# Patient Record
Sex: FEMALE | Race: WHITE | NOT HISPANIC OR LATINO
[De-identification: names, ages, dates, MRNs, and addresses within clinical notes are randomized per-mention and may not be internally consistent; named-entity substitution may affect disease eponyms.]

---

## 2019-03-06 DIAGNOSIS — R06.02 SHORTNESS OF BREATH: ICD-10-CM

## 2019-03-06 DIAGNOSIS — I44.7 LEFT BUNDLE-BRANCH BLOCK, UNSPECIFIED: ICD-10-CM

## 2019-03-06 DIAGNOSIS — E78.5 HYPERLIPIDEMIA, UNSPECIFIED: ICD-10-CM

## 2019-03-06 DIAGNOSIS — I10 ESSENTIAL (PRIMARY) HYPERTENSION: ICD-10-CM

## 2019-03-06 DIAGNOSIS — Z78.9 OTHER SPECIFIED HEALTH STATUS: ICD-10-CM

## 2019-03-06 DIAGNOSIS — E03.9 HYPOTHYROIDISM, UNSPECIFIED: ICD-10-CM

## 2019-03-06 PROBLEM — Z00.00 ENCOUNTER FOR PREVENTIVE HEALTH EXAMINATION: Status: ACTIVE | Noted: 2019-03-06

## 2019-03-06 RX ORDER — SACUBITRIL AND VALSARTAN 97; 103 MG/1; MG/1
97-103 TABLET, FILM COATED ORAL TWICE DAILY
Refills: 0 | Status: ACTIVE | COMMUNITY
Start: 2019-03-06

## 2019-03-06 RX ORDER — UBIDECARENONE 100 MG
100 CAPSULE ORAL
Refills: 0 | Status: ACTIVE | COMMUNITY
Start: 2019-03-06

## 2019-03-06 RX ORDER — ASPIRIN ENTERIC COATED TABLETS 81 MG 81 MG/1
81 TABLET, DELAYED RELEASE ORAL
Qty: 90 | Refills: 2 | Status: ACTIVE | COMMUNITY
Start: 2019-03-06

## 2019-03-06 RX ORDER — LEVOTHYROXINE SODIUM 75 UG/1
75 TABLET ORAL DAILY
Qty: 90 | Refills: 0 | Status: ACTIVE | COMMUNITY
Start: 2019-03-06

## 2019-03-06 RX ORDER — CARVEDILOL 12.5 MG/1
12.5 TABLET, FILM COATED ORAL
Qty: 180 | Refills: 3 | Status: ACTIVE | COMMUNITY
Start: 2019-03-06

## 2019-03-06 RX ORDER — ATORVASTATIN CALCIUM 20 MG/1
20 TABLET, FILM COATED ORAL
Qty: 30 | Refills: 1 | Status: ACTIVE | COMMUNITY
Start: 2019-03-06

## 2019-03-06 RX ORDER — CHOLECALCIFEROL (VITAMIN D3) 25 MCG
TABLET,CHEWABLE ORAL
Refills: 0 | Status: ACTIVE | COMMUNITY
Start: 2019-03-06

## 2019-03-07 ENCOUNTER — APPOINTMENT (OUTPATIENT)
Dept: ELECTROPHYSIOLOGY | Facility: CLINIC | Age: 79
End: 2019-03-07
Payer: MEDICARE

## 2019-03-07 VITALS
HEART RATE: 73 BPM | SYSTOLIC BLOOD PRESSURE: 118 MMHG | WEIGHT: 162 LBS | HEIGHT: 65 IN | OXYGEN SATURATION: 99 % | DIASTOLIC BLOOD PRESSURE: 72 MMHG | BODY MASS INDEX: 26.99 KG/M2

## 2019-03-07 PROCEDURE — 99204 OFFICE O/P NEW MOD 45 MIN: CPT

## 2019-03-07 PROCEDURE — 93000 ELECTROCARDIOGRAM COMPLETE: CPT

## 2019-03-07 NOTE — DISCUSSION/SUMMARY
[FreeTextEntry1] : 78 year old woman with history of ovarian ca (s/p chemo, surgery, now in remission), dilated nonischemic cardiomyopathy, and LBBB presenting for consideration of cardiac device therapy. She has CHF with LVEF <=35% despite compliance with GDMT now for over 3 months, and now qualifies for primary prevention ICD. In addition, she has a LBBB with wide QRS, and there is a likely contribution of interventricular dyssynchrony contributing to her cardiomyopathy- she would also likely benefit from from cardiac resynchronization therapy. I have therefore suggested CRT-defibrillator implant. The risks and benefits of this procedure were reviewed in detail, including procedure related risks of bleeding, infection, vascular injury, cardiac perforation and pulmonary injury. In addition, we discussed long-term implications of ICD therapy including potential for ICD shocks (appropriate and inappropriate). After a shared decision making process involving the patient, her , Dr. Aguirre and myself, decision was made to proceed with CRT-D implant. \par We also discussed the increased risks associated with her indwelling port- as her cancer has been in remission, she has not been using the port, and she expressed no interest in repeat chemotherapy even if indicated, would prefer to have the port removed prior to cardiac device implant. In addition, we discussed that if her cancer does relapse and she does not want chemotherapy, a primary prevention ICD may no longer be indicated. She will follow-up with her oncologist shortly regarding the potential to have these options evaluated asap.\par -CRT-D implant as above (left sided)\par -f/u with oncology re ovarian ca prognosis and potential for right sided vascular port removal prior to device implant\par  \par

## 2019-03-07 NOTE — REVIEW OF SYSTEMS
[Feeling Fatigued] : feeling fatigued [Dyspnea on exertion] : dyspnea during exertion [Palpitations] : palpitations [Anxiety] : anxiety [Negative] : Neurological [Fever] : no fever [Chills] : no chills [Shortness Of Breath] : no shortness of breath [Chest Pain] : no chest pain [Lower Ext Edema] : no extremity edema [Dizziness] : no dizziness [Convulsions] : no convulsions [Confusion] : no confusion was observed [Easy Bleeding] : no tendency for easy bleeding [Easy Bruising] : no tendency for easy bruising

## 2019-03-07 NOTE — REASON FOR VISIT
[Consultation] : a consultation regarding [Cardiomyopathy] : cardiomyopathy [Spouse] : spouse [FreeTextEntry1] : ref Dr Aguirre

## 2019-03-07 NOTE — PHYSICAL EXAM
[General Appearance - Well Developed] : well developed [Normal Appearance] : normal appearance [Well Groomed] : well groomed [General Appearance - Well Nourished] : well nourished [Normal Conjunctiva] : the conjunctiva exhibited no abnormalities [Normal Oral Mucosa] : normal oral mucosa [Normal Jugular Venous V Waves Present] : normal jugular venous V waves present [Respiration, Rhythm And Depth] : normal respiratory rhythm and effort [Auscultation Breath Sounds / Voice Sounds] : lungs were clear to auscultation bilaterally [Chest Palpation] : palpation of the chest revealed no abnormalities [Bowel Sounds] : normal bowel sounds [Abdomen Soft] : soft [Abdomen Tenderness] : non-tender [Abnormal Walk] : normal gait [Nail Clubbing] : no clubbing of the fingernails [Cyanosis, Localized] : no localized cyanosis [Skin Color & Pigmentation] : normal skin color and pigmentation [Skin Turgor] : normal skin turgor [] : no rash [Oriented To Time, Place, And Person] : oriented to person, place, and time [No Anxiety] : not feeling anxious [Heart Rate And Rhythm] : heart rate and rhythm were normal [Heart Sounds] : normal S1 and S2 [Edema] : no peripheral edema present

## 2019-03-07 NOTE — HISTORY OF PRESENT ILLNESS
[FreeTextEntry1] : 78 year old woman with history of ovarian ca (s/p chemo, surgery, now in remission), dilated cardiomyopathy, and LBBB presenting for consideration of cardiac device therapy. \par She presented last year with fatigue and dyspnea on exersion and was found to have dilated cardiomyopathy. TTE in 11/21/18 revealed LVEF 27% and severe MR abd Cath 11/29/18 revealed no obstructive CAD. She has been on CHF medical therapy since that time with Coreg 12.5mg bid and Entresto, in addition to ASA and statin, and has been compliant. Since starting medical therapy she has noted some improvement in her symptoms, and repeat TTE 2/27/19 revealed LV function only slightly improved with LVEF 35%, and mod-severe MR. \par ECG reveals sinus rhythm with LBBB and QRS duration 157 ms. \par \par She has not been highly active recently, but notes no significant dyspnea with very short distances of walking- she has not recently walked up stairs. She notes occasional brief palpitation. She denies fevers, chills, lightheadedness, syncope, LE edema or orthopnea. \par Holter monitor 11/7/18 revealed sinus rhythm average rage 82 bpm (range  bpm) rare PACs and PVCs, pSVT up to 6 beats at up to 117 bpm\par \par Of note, her ovarian cancer is now in remission, and she has followup biomarker tests routinely. She has a right sided vascular port which has been in place, but currently not used (apart from regular flushes). \par \par Current medications include ASA 81, Coreg 12.5mg bid, Entresto, synthroid\par

## 2019-06-25 ENCOUNTER — OUTPATIENT (OUTPATIENT)
Dept: OUTPATIENT SERVICES | Facility: HOSPITAL | Age: 79
LOS: 1 days | End: 2019-06-25
Payer: MEDICARE

## 2019-06-25 VITALS
SYSTOLIC BLOOD PRESSURE: 129 MMHG | RESPIRATION RATE: 16 BRPM | HEIGHT: 67 IN | HEART RATE: 72 BPM | DIASTOLIC BLOOD PRESSURE: 64 MMHG | TEMPERATURE: 98 F | OXYGEN SATURATION: 97 % | WEIGHT: 166.89 LBS

## 2019-06-25 VITALS
OXYGEN SATURATION: 99 % | RESPIRATION RATE: 18 BRPM | WEIGHT: 237 LBS | DIASTOLIC BLOOD PRESSURE: 81 MMHG | SYSTOLIC BLOOD PRESSURE: 144 MMHG | HEIGHT: 72 IN | HEART RATE: 72 BPM | TEMPERATURE: 98 F

## 2019-06-25 DIAGNOSIS — Z90.49 ACQUIRED ABSENCE OF OTHER SPECIFIED PARTS OF DIGESTIVE TRACT: Chronic | ICD-10-CM

## 2019-06-25 DIAGNOSIS — Z98.890 OTHER SPECIFIED POSTPROCEDURAL STATES: Chronic | ICD-10-CM

## 2019-06-25 DIAGNOSIS — Z01.810 ENCOUNTER FOR PREPROCEDURAL CARDIOVASCULAR EXAMINATION: ICD-10-CM

## 2019-06-25 DIAGNOSIS — K76.9 LIVER DISEASE, UNSPECIFIED: Chronic | ICD-10-CM

## 2019-06-25 DIAGNOSIS — I50.9 HEART FAILURE, UNSPECIFIED: ICD-10-CM

## 2019-06-25 DIAGNOSIS — Z90.710 ACQUIRED ABSENCE OF BOTH CERVIX AND UTERUS: Chronic | ICD-10-CM

## 2019-06-25 LAB
ANION GAP SERPL CALC-SCNC: 12 MMOL/L — SIGNIFICANT CHANGE UP (ref 5–17)
APTT BLD: 36.5 SEC — HIGH (ref 27.5–36.3)
BLD GP AB SCN SERPL QL: SIGNIFICANT CHANGE UP
BUN SERPL-MCNC: 29 MG/DL — HIGH (ref 8–20)
CALCIUM SERPL-MCNC: 9.8 MG/DL — SIGNIFICANT CHANGE UP (ref 8.6–10.2)
CHLORIDE SERPL-SCNC: 100 MMOL/L — SIGNIFICANT CHANGE UP (ref 98–107)
CO2 SERPL-SCNC: 24 MMOL/L — SIGNIFICANT CHANGE UP (ref 22–29)
CREAT SERPL-MCNC: 0.85 MG/DL — SIGNIFICANT CHANGE UP (ref 0.5–1.3)
GLUCOSE SERPL-MCNC: 94 MG/DL — SIGNIFICANT CHANGE UP (ref 70–115)
HCT VFR BLD CALC: 37.2 % — SIGNIFICANT CHANGE UP (ref 34.5–45)
HGB BLD-MCNC: 12.1 G/DL — SIGNIFICANT CHANGE UP (ref 11.5–15.5)
INR BLD: 0.99 RATIO — SIGNIFICANT CHANGE UP (ref 0.88–1.16)
MAGNESIUM SERPL-MCNC: 2.2 MG/DL — SIGNIFICANT CHANGE UP (ref 1.6–2.6)
MCHC RBC-ENTMCNC: 30.9 PG — SIGNIFICANT CHANGE UP (ref 27–34)
MCHC RBC-ENTMCNC: 32.5 GM/DL — SIGNIFICANT CHANGE UP (ref 32–36)
MCV RBC AUTO: 95.1 FL — SIGNIFICANT CHANGE UP (ref 80–100)
PLATELET # BLD AUTO: 245 K/UL — SIGNIFICANT CHANGE UP (ref 150–400)
POTASSIUM SERPL-MCNC: 4.7 MMOL/L — SIGNIFICANT CHANGE UP (ref 3.5–5.3)
POTASSIUM SERPL-SCNC: 4.7 MMOL/L — SIGNIFICANT CHANGE UP (ref 3.5–5.3)
PROTHROM AB SERPL-ACNC: 11.4 SEC — SIGNIFICANT CHANGE UP (ref 10–12.9)
RBC # BLD: 3.91 M/UL — SIGNIFICANT CHANGE UP (ref 3.8–5.2)
RBC # FLD: 12.4 % — SIGNIFICANT CHANGE UP (ref 10.3–14.5)
SODIUM SERPL-SCNC: 136 MMOL/L — SIGNIFICANT CHANGE UP (ref 135–145)
TYPE + AB SCN PNL BLD: SIGNIFICANT CHANGE UP
WBC # BLD: 4.98 K/UL — SIGNIFICANT CHANGE UP (ref 3.8–10.5)
WBC # FLD AUTO: 4.98 K/UL — SIGNIFICANT CHANGE UP (ref 3.8–10.5)

## 2019-06-25 PROCEDURE — 93010 ELECTROCARDIOGRAM REPORT: CPT

## 2019-06-25 NOTE — H&P PST ADULT - ASSESSMENT
79 year old female with history of ovarian cancer (s/p chemo/surgery, now in remission), HTN, hypothyroidism, hyperlipidemia, dilated non-ischemic cardiomyopathy and LBBB.  She has CHF with LVEF <=35% despite compliance with goal directed medical therapy for over 6 months.  In addition, she has a LBBB with wide QRS and there is likely contribution of interventricular dyssynchrony contributing to her cardiomyopathy.  Given these factors, she will benefit from cardiac resynchronization therapy and primary prevention ICD.  She presents today for PST in anticipation of elective CRT-D implant. 79 year old female with history of ovarian cancer (s/p chemo/surgery, now in remission), HTN, hypothyroidism, hyperlipidemia, dilated non-ischemic cardiomyopathy and LBBB.  She has CHF with LVEF <=35% despite compliance with goal directed medical therapy for over 6 months.  In addition, she has a LBBB with wide QRS and there is likely contribution of interventricular dyssynchrony contributing to her cardiomyopathy.  Given these factors, she will benefit from cardiac resynchronization therapy and primary prevention ICD.  She presents today for PST in anticipation of elective CRT-D implant.       Plan:   Pre-procedure instructions provided (verbal & written) as follows:    CRT-D 7/2/2019 (10 AM arrival)   - NPO after midnight prior except meds w/ sips of water.   Discharge teaching initiated.

## 2019-06-25 NOTE — H&P PST ADULT - HISTORY OF PRESENT ILLNESS
79 year old female with history of ovarian cancer (s/p chemo/surgery, now in remission), HTN, hypothyroidism, hyperlipidemia, dilated non-ischemic cardiomyopathy and LBBB.  She has CHF with LVEF <=35% despite compliance with goal directed medical therapy for over 6 months.  In addition, she has a LBBB with wide QRS and there is likely contribution of interventricular dyssynchrony contributing to her cardiomyopathy.  Given these factors, she will benefit from cardiac resynchronization therapy and primary prevention ICD.  She presents today for PST in anticipation of elective CRT-D implant.          Cardiology summary:  Stress test: 11/16/18, abnormal spect, LVEF 20%, severe Lv dilatation  Echo: 2/27/19, LVEF 35%, globally reduced LV function, RCA distribution and entire septum are abnormal, mod dilated LV, LA mildly dilated, mod-severe MR, mod TR   Cardiac Cath: 11/29/18, LAD moderate irregularities, LCx moderate irregularities, RCA moderate irregularities.  LVEF 25%

## 2019-06-25 NOTE — H&P PST ADULT - NSICDXPASTSURGICALHX_GEN_ALL_CORE_FT
PAST SURGICAL HISTORY:  H/O umbilical hernia repair 1968    History of removal of Port-a-Cath     Liver lesion excision April 2016 2' ovarian cancer    S/P appendectomy April 2016 2' ovarian ca    S/P cholecystectomy April 2016 2' ovarian ca    S/P total hysterectomy Jan. 2014 2' ovarian cancer

## 2019-06-25 NOTE — ASU PATIENT PROFILE, ADULT - PMH
Congestive heart failure    Hyperlipidemia    Hypertension    Hypothyroidism    Left bundle branch block    Non-ischemic cardiomyopathy    Ovarian cancer  s/p chemo and surgery - in remission

## 2019-06-25 NOTE — ASU PATIENT PROFILE, ADULT - PSH
H/O umbilical hernia repair  1968  History of removal of Port-a-Cath    Liver lesion  excision April 2016 2' ovarian cancer  S/P appendectomy  April 2016 2' ovarian ca  S/P cholecystectomy  April 2016 2' ovarian ca  S/P total hysterectomy  Jan. 2014 2' ovarian cancer

## 2019-06-25 NOTE — H&P PST ADULT - NSANTHOSAYNRD_GEN_A_CORE
No. ROSY screening performed.  STOP BANG Legend: 0-2 = LOW Risk; 3-4 = INTERMEDIATE Risk; 5-8 = HIGH Risk

## 2019-07-02 ENCOUNTER — TRANSCRIPTION ENCOUNTER (OUTPATIENT)
Age: 79
End: 2019-07-02

## 2019-07-02 ENCOUNTER — INPATIENT (INPATIENT)
Facility: HOSPITAL | Age: 79
LOS: 0 days | Discharge: HOME | DRG: 227 | End: 2019-07-03
Attending: STUDENT IN AN ORGANIZED HEALTH CARE EDUCATION/TRAINING PROGRAM | Admitting: STUDENT IN AN ORGANIZED HEALTH CARE EDUCATION/TRAINING PROGRAM
Payer: MEDICARE

## 2019-07-02 VITALS
SYSTOLIC BLOOD PRESSURE: 125 MMHG | RESPIRATION RATE: 16 BRPM | TEMPERATURE: 98 F | OXYGEN SATURATION: 98 % | DIASTOLIC BLOOD PRESSURE: 57 MMHG | HEART RATE: 70 BPM

## 2019-07-02 DIAGNOSIS — Z90.49 ACQUIRED ABSENCE OF OTHER SPECIFIED PARTS OF DIGESTIVE TRACT: Chronic | ICD-10-CM

## 2019-07-02 DIAGNOSIS — I44.7 LEFT BUNDLE-BRANCH BLOCK, UNSPECIFIED: ICD-10-CM

## 2019-07-02 DIAGNOSIS — I50.9 HEART FAILURE, UNSPECIFIED: ICD-10-CM

## 2019-07-02 DIAGNOSIS — K76.9 LIVER DISEASE, UNSPECIFIED: Chronic | ICD-10-CM

## 2019-07-02 DIAGNOSIS — Z90.710 ACQUIRED ABSENCE OF BOTH CERVIX AND UTERUS: Chronic | ICD-10-CM

## 2019-07-02 DIAGNOSIS — Z98.890 OTHER SPECIFIED POSTPROCEDURAL STATES: Chronic | ICD-10-CM

## 2019-07-02 DIAGNOSIS — Z01.810 ENCOUNTER FOR PREPROCEDURAL CARDIOVASCULAR EXAMINATION: ICD-10-CM

## 2019-07-02 LAB — ABO RH CONFIRMATION: SIGNIFICANT CHANGE UP

## 2019-07-02 PROCEDURE — 86923 COMPATIBILITY TEST ELECTRIC: CPT

## 2019-07-02 PROCEDURE — 86900 BLOOD TYPING SEROLOGIC ABO: CPT

## 2019-07-02 PROCEDURE — G0463: CPT

## 2019-07-02 PROCEDURE — 93641 EP EVL 1/2CHMB PAC CVDFB TST: CPT | Mod: 26,59

## 2019-07-02 PROCEDURE — 86850 RBC ANTIBODY SCREEN: CPT

## 2019-07-02 PROCEDURE — 36415 COLL VENOUS BLD VENIPUNCTURE: CPT

## 2019-07-02 PROCEDURE — 71045 X-RAY EXAM CHEST 1 VIEW: CPT | Mod: 26

## 2019-07-02 PROCEDURE — 83735 ASSAY OF MAGNESIUM: CPT

## 2019-07-02 PROCEDURE — 93005 ELECTROCARDIOGRAM TRACING: CPT

## 2019-07-02 PROCEDURE — 33225 L VENTRIC PACING LEAD ADD-ON: CPT

## 2019-07-02 PROCEDURE — 33249 INSJ/RPLCMT DEFIB W/LEAD(S): CPT

## 2019-07-02 PROCEDURE — 85027 COMPLETE CBC AUTOMATED: CPT

## 2019-07-02 PROCEDURE — 80048 BASIC METABOLIC PNL TOTAL CA: CPT

## 2019-07-02 PROCEDURE — 85730 THROMBOPLASTIN TIME PARTIAL: CPT

## 2019-07-02 PROCEDURE — 86901 BLOOD TYPING SEROLOGIC RH(D): CPT

## 2019-07-02 PROCEDURE — 85610 PROTHROMBIN TIME: CPT

## 2019-07-02 RX ORDER — HYDROCORTISONE 20 MG
100 TABLET ORAL ONCE
Refills: 0 | Status: COMPLETED | OUTPATIENT
Start: 2019-07-02 | End: 2019-07-02

## 2019-07-02 RX ORDER — ASPIRIN/CALCIUM CARB/MAGNESIUM 324 MG
81 TABLET ORAL DAILY
Refills: 0 | Status: DISCONTINUED | OUTPATIENT
Start: 2019-07-02 | End: 2019-07-03

## 2019-07-02 RX ORDER — ATORVASTATIN CALCIUM 80 MG/1
20 TABLET, FILM COATED ORAL AT BEDTIME
Refills: 0 | Status: DISCONTINUED | OUTPATIENT
Start: 2019-07-02 | End: 2019-07-03

## 2019-07-02 RX ORDER — LACTOBACILLUS ACIDOPHILUS 100MM CELL
1 CAPSULE ORAL DAILY
Refills: 0 | Status: DISCONTINUED | OUTPATIENT
Start: 2019-07-02 | End: 2019-07-03

## 2019-07-02 RX ORDER — DIPHENHYDRAMINE HCL 50 MG
50 CAPSULE ORAL ONCE
Refills: 0 | Status: DISCONTINUED | OUTPATIENT
Start: 2019-07-02 | End: 2019-07-03

## 2019-07-02 RX ORDER — OXYCODONE AND ACETAMINOPHEN 5; 325 MG/1; MG/1
1 TABLET ORAL EVERY 4 HOURS
Refills: 0 | Status: DISCONTINUED | OUTPATIENT
Start: 2019-07-02 | End: 2019-07-03

## 2019-07-02 RX ORDER — CHOLECALCIFEROL (VITAMIN D3) 125 MCG
1000 CAPSULE ORAL DAILY
Refills: 0 | Status: DISCONTINUED | OUTPATIENT
Start: 2019-07-02 | End: 2019-07-03

## 2019-07-02 RX ORDER — SACUBITRIL AND VALSARTAN 24; 26 MG/1; MG/1
1 TABLET, FILM COATED ORAL
Refills: 0 | Status: DISCONTINUED | OUTPATIENT
Start: 2019-07-02 | End: 2019-07-03

## 2019-07-02 RX ORDER — LEVOTHYROXINE SODIUM 125 MCG
75 TABLET ORAL DAILY
Refills: 0 | Status: DISCONTINUED | OUTPATIENT
Start: 2019-07-02 | End: 2019-07-03

## 2019-07-02 RX ORDER — CARVEDILOL PHOSPHATE 80 MG/1
12.5 CAPSULE, EXTENDED RELEASE ORAL EVERY 12 HOURS
Refills: 0 | Status: DISCONTINUED | OUTPATIENT
Start: 2019-07-02 | End: 2019-07-03

## 2019-07-02 RX ADMIN — ATORVASTATIN CALCIUM 20 MILLIGRAM(S): 80 TABLET, FILM COATED ORAL at 22:34

## 2019-07-02 RX ADMIN — Medication 100 MILLIGRAM(S): at 11:18

## 2019-07-02 RX ADMIN — Medication 100 MILLIGRAM(S): at 21:00

## 2019-07-02 RX ADMIN — Medication 1 TABLET(S): at 17:37

## 2019-07-02 RX ADMIN — Medication 81 MILLIGRAM(S): at 17:36

## 2019-07-02 RX ADMIN — OXYCODONE AND ACETAMINOPHEN 1 TABLET(S): 5; 325 TABLET ORAL at 22:40

## 2019-07-02 RX ADMIN — Medication 1000 UNIT(S): at 17:37

## 2019-07-02 RX ADMIN — OXYCODONE AND ACETAMINOPHEN 1 TABLET(S): 5; 325 TABLET ORAL at 23:40

## 2019-07-02 RX ADMIN — SACUBITRIL AND VALSARTAN 1 TABLET(S): 24; 26 TABLET, FILM COATED ORAL at 17:37

## 2019-07-02 NOTE — DISCHARGE NOTE PROVIDER - NSDCCPTREATMENT_GEN_ALL_CORE_FT
PRINCIPAL PROCEDURE  Procedure: Implantation of biventricular defibrillator  Findings and Treatment:

## 2019-07-02 NOTE — DISCHARGE NOTE PROVIDER - NSDCFUADDINST_GEN_ALL_CORE_FT
Cardiac Device Implant Post Operative Instructions  - Do not touch the incision until it is completely healed.   - There are Steristrips (white strips of tape) on your incision, which will start to peel off on their own over the next 2-3 weeks. Do not pick at or peel off the Steristrips.   - Bruising around the implant site or over the chest, side or arm near the incision is normal, and will take a few weeks to resolve.  -Do not lift the affected arm higher than 90 degrees (shoulder height) in any direction for 4 weeks.   - Do not push, pull or lift anything heavier than 10 lbs (about a gallon of milk) with the affected arm for 4 weeks.     - Do not apply soaps, creams, lotions, ointments or powders to the incision until it is completely healed.  - You may take a shower in 24 hours, and allow the water to run over the incision. However, do not submerge the incision in water: do not swim or soak in bath tubs, hot tubs, swimming pools, etc.   You should call the doctor if:   - You notice redness, drainage, swelling, increased tenderness, hot sensation around the incision, bleeding or incision edges pulling apart.  - Your temperature is greater than 100 degrees F for more than 24 hours.  - You notice swelling or bulging at the incision or around the device that was not there when you left the hospital or is increasing in size.  - You experience increased difficulty breathing.  - You notice new/worsening swelling in your legs and ankles.  - You faint or have dizzy spells.  - You have any questions or concerns regarding your device or the procedure.

## 2019-07-02 NOTE — DISCHARGE NOTE PROVIDER - CARE PROVIDER_API CALL
Gibran Cuadra)  Cardiology; Internal Medicine  [address] [facility], [address] [place] [address]  Phone: [phone]  Fax: [phone]  Follow Up Time: 2 weeks

## 2019-07-02 NOTE — DISCHARGE NOTE PROVIDER - NSDCCPCAREPLAN_GEN_ALL_CORE_FT
PRINCIPAL DISCHARGE DIAGNOSIS  Diagnosis: CHF with cardiomyopathy  Assessment and Plan of Treatment:

## 2019-07-02 NOTE — PROGRESS NOTE ADULT - SUBJECTIVE AND OBJECTIVE BOX
Admission Criteria  Please admit the patient to the following service: Telemetry 3GUL    Major Criteria:  - LV dysfunction (EF<30%)  - Significant volume load > 200 ml    Admit to: Telemetry 3GUL (1 Major ciriteria/2 or more minor criteria) Patient is being admitted to the inpatient service due to high risk characteristics and need for further management/monitoring and is considered to be at a significantly increased risk of major adverse cardiac and vascular events if discharged.

## 2019-07-02 NOTE — DISCHARGE NOTE PROVIDER - HOSPITAL COURSE
79 year old female with history of ovarian cancer (s/p chemo/surgery, now in remission), HTN, hypothyroidism, hyperlipidemia, dilated non-ischemic cardiomyopathy, EF <35%, and LBBB who is now s/p uncomplicated Medtronic CRT-D implantation via left axillary stick.

## 2019-07-03 ENCOUNTER — TRANSCRIPTION ENCOUNTER (OUTPATIENT)
Age: 79
End: 2019-07-03

## 2019-07-03 VITALS
DIASTOLIC BLOOD PRESSURE: 55 MMHG | SYSTOLIC BLOOD PRESSURE: 106 MMHG | RESPIRATION RATE: 16 BRPM | HEART RATE: 60 BPM | OXYGEN SATURATION: 98 %

## 2019-07-03 PROBLEM — C56.9 MALIGNANT NEOPLASM OF UNSPECIFIED OVARY: Chronic | Status: ACTIVE | Noted: 2019-06-25

## 2019-07-03 PROBLEM — I42.8 OTHER CARDIOMYOPATHIES: Chronic | Status: ACTIVE | Noted: 2019-06-25

## 2019-07-03 PROBLEM — E78.5 HYPERLIPIDEMIA, UNSPECIFIED: Chronic | Status: ACTIVE | Noted: 2019-06-25

## 2019-07-03 PROBLEM — I10 ESSENTIAL (PRIMARY) HYPERTENSION: Chronic | Status: ACTIVE | Noted: 2019-06-25

## 2019-07-03 PROBLEM — E03.9 HYPOTHYROIDISM, UNSPECIFIED: Chronic | Status: ACTIVE | Noted: 2019-06-25

## 2019-07-03 PROBLEM — I44.7 LEFT BUNDLE-BRANCH BLOCK, UNSPECIFIED: Chronic | Status: ACTIVE | Noted: 2019-06-25

## 2019-07-03 PROBLEM — I50.9 HEART FAILURE, UNSPECIFIED: Chronic | Status: ACTIVE | Noted: 2019-06-25

## 2019-07-03 LAB
ANION GAP SERPL CALC-SCNC: 9 MMOL/L — SIGNIFICANT CHANGE UP (ref 5–17)
BASOPHILS # BLD AUTO: 0.01 K/UL — SIGNIFICANT CHANGE UP (ref 0–0.2)
BASOPHILS NFR BLD AUTO: 0.1 % — SIGNIFICANT CHANGE UP (ref 0–2)
BUN SERPL-MCNC: 31 MG/DL — HIGH (ref 8–20)
CALCIUM SERPL-MCNC: 8.7 MG/DL — SIGNIFICANT CHANGE UP (ref 8.6–10.2)
CHLORIDE SERPL-SCNC: 107 MMOL/L — SIGNIFICANT CHANGE UP (ref 98–107)
CO2 SERPL-SCNC: 22 MMOL/L — SIGNIFICANT CHANGE UP (ref 22–29)
CREAT SERPL-MCNC: 0.91 MG/DL — SIGNIFICANT CHANGE UP (ref 0.5–1.3)
EOSINOPHIL # BLD AUTO: 0.04 K/UL — SIGNIFICANT CHANGE UP (ref 0–0.5)
EOSINOPHIL NFR BLD AUTO: 0.6 % — SIGNIFICANT CHANGE UP (ref 0–6)
GLUCOSE SERPL-MCNC: 96 MG/DL — SIGNIFICANT CHANGE UP (ref 70–115)
HCT VFR BLD CALC: 32.4 % — LOW (ref 34.5–45)
HGB BLD-MCNC: 10.6 G/DL — LOW (ref 11.5–15.5)
IMM GRANULOCYTES NFR BLD AUTO: 0.1 % — SIGNIFICANT CHANGE UP (ref 0–1.5)
LYMPHOCYTES # BLD AUTO: 1.46 K/UL — SIGNIFICANT CHANGE UP (ref 1–3.3)
LYMPHOCYTES # BLD AUTO: 21.7 % — SIGNIFICANT CHANGE UP (ref 13–44)
MAGNESIUM SERPL-MCNC: 2.2 MG/DL — SIGNIFICANT CHANGE UP (ref 1.6–2.6)
MCHC RBC-ENTMCNC: 31.3 PG — SIGNIFICANT CHANGE UP (ref 27–34)
MCHC RBC-ENTMCNC: 32.7 GM/DL — SIGNIFICANT CHANGE UP (ref 32–36)
MCV RBC AUTO: 95.6 FL — SIGNIFICANT CHANGE UP (ref 80–100)
MONOCYTES # BLD AUTO: 0.54 K/UL — SIGNIFICANT CHANGE UP (ref 0–0.9)
MONOCYTES NFR BLD AUTO: 8 % — SIGNIFICANT CHANGE UP (ref 2–14)
NEUTROPHILS # BLD AUTO: 4.68 K/UL — SIGNIFICANT CHANGE UP (ref 1.8–7.4)
NEUTROPHILS NFR BLD AUTO: 69.5 % — SIGNIFICANT CHANGE UP (ref 43–77)
PLATELET # BLD AUTO: 204 K/UL — SIGNIFICANT CHANGE UP (ref 150–400)
POTASSIUM SERPL-MCNC: 4.4 MMOL/L — SIGNIFICANT CHANGE UP (ref 3.5–5.3)
POTASSIUM SERPL-SCNC: 4.4 MMOL/L — SIGNIFICANT CHANGE UP (ref 3.5–5.3)
RBC # BLD: 3.39 M/UL — LOW (ref 3.8–5.2)
RBC # FLD: 12.7 % — SIGNIFICANT CHANGE UP (ref 10.3–14.5)
SODIUM SERPL-SCNC: 138 MMOL/L — SIGNIFICANT CHANGE UP (ref 135–145)
WBC # BLD: 6.74 K/UL — SIGNIFICANT CHANGE UP (ref 3.8–10.5)
WBC # FLD AUTO: 6.74 K/UL — SIGNIFICANT CHANGE UP (ref 3.8–10.5)

## 2019-07-03 PROCEDURE — 71046 X-RAY EXAM CHEST 2 VIEWS: CPT | Mod: 26

## 2019-07-03 RX ADMIN — Medication 75 MICROGRAM(S): at 06:07

## 2019-07-03 RX ADMIN — CARVEDILOL PHOSPHATE 12.5 MILLIGRAM(S): 80 CAPSULE, EXTENDED RELEASE ORAL at 06:07

## 2019-07-03 RX ADMIN — SACUBITRIL AND VALSARTAN 1 TABLET(S): 24; 26 TABLET, FILM COATED ORAL at 06:07

## 2019-07-03 NOTE — PROGRESS NOTE ADULT - SUBJECTIVE AND OBJECTIVE BOX
Patient seen today in bed. Pressure dressing removed from left chest wall without complication.     EKG: pending  TELE: AS BIV paced. No events    MEDICATIONS  (STANDING):  aspirin enteric coated 81 milliGRAM(s) Oral daily  atorvastatin 20 milliGRAM(s) Oral at bedtime  carvedilol 12.5 milliGRAM(s) Oral every 12 hours  cholecalciferol 1000 Unit(s) Oral daily  diphenhydrAMINE   Injectable 50 milliGRAM(s) IV Push once  lactobacillus acidophilus 1 Tablet(s) Oral daily  levothyroxine 75 MICROGram(s) Oral daily  multivitamin 1 Tablet(s) Oral daily  sacubitril 97 mG/valsartan 103 mG 1 Tablet(s) Oral two times a day    MEDICATIONS  (PRN):  aluminum hydroxide/magnesium hydroxide/simethicone Suspension 30 milliLiter(s) Oral every 4 hours PRN Dyspepsia  oxyCODONE    5 mG/acetaminophen 325 mG 1 Tablet(s) Oral every 4 hours PRN Moderate Pain (4 - 6)    Allergies    contrast media (iodine-based) (Other)  Naprosyn (Other)  Novocain (Other)  penicillin (Hives)  sulfa drugs (Angioedema)    Intolerances  NSAIDs (Other)    PAST MEDICAL & SURGICAL HISTORY:  Non-ischemic cardiomyopathy  Congestive heart failure  Left bundle branch block  Hyperlipidemia  Hypothyroidism  Hypertension  Ovarian cancer: s/p chemo and surgery - in remission  History of removal of Port-a-Cath  H/O umbilical hernia repair: 1968  Liver lesion: excision April 2016 2&#x27; ovarian cancer  S/P total hysterectomy: Jan. 2014 2&#x27; ovarian cancer  S/P cholecystectomy: April 2016 2&#x27; ovarian ca  S/P appendectomy: April 2016 2&#x27; ovarian ca    Vital Signs Last 24 Hrs  T(C): 36.6 (03 Jul 2019 06:03), Max: 36.6 (03 Jul 2019 06:03)  T(F): 97.8 (03 Jul 2019 06:03), Max: 97.8 (03 Jul 2019 06:03)  HR: 61 (03 Jul 2019 07:45) (57 - 88)  BP: 100/54 (03 Jul 2019 07:45) (89/59 - 125/57)  BP(mean): 81 (03 Jul 2019 06:03) (7 - 81)  RR: 15 (03 Jul 2019 06:03) (15 - 18)  SpO2: 98% (03 Jul 2019 06:03) (94% - 98%)    Physical Exam:  Constitutional: NAD, AAOx3  Cardiovascular: +S1S2 RRR  Pulmonary: CTA b/l, unlabored  Left Chest Wall: No hematoma  GI: soft NTND +BS  Extremities: no pedal edema,   Neuro: non focal, CANCINO x4    LABS:                        10.6   6.74  )-----------( 204      ( 03 Jul 2019 05:19 )             32.4     138  |  107  |  31.0<H>  ----------------------------<  96  4.4   |  22.0  |  0.91  Ca    8.7      03 Jul 2019 05:19  Mg     2.2     07-03    A/P  79 year old female with history of ovarian cancer (s/p chemo/surgery, now in remission), HTN, hypothyroidism, hyperlipidemia, dilated non-ischemic cardiomyopathy, EF <35%, and LBBB who is now s/p uncomplicated Medtronic CRT-D implantation via left axillary stick.      - Discharge home today. Patient seen today in bed. Pressure dressing removed from left chest wall without complication.     EKG: pending  TELE: AS BIV paced. No events    MEDICATIONS  (STANDING):  aspirin enteric coated 81 milliGRAM(s) Oral daily  atorvastatin 20 milliGRAM(s) Oral at bedtime  carvedilol 12.5 milliGRAM(s) Oral every 12 hours  cholecalciferol 1000 Unit(s) Oral daily  diphenhydrAMINE   Injectable 50 milliGRAM(s) IV Push once  lactobacillus acidophilus 1 Tablet(s) Oral daily  levothyroxine 75 MICROGram(s) Oral daily  multivitamin 1 Tablet(s) Oral daily  sacubitril 97 mG/valsartan 103 mG 1 Tablet(s) Oral two times a day    MEDICATIONS  (PRN):  aluminum hydroxide/magnesium hydroxide/simethicone Suspension 30 milliLiter(s) Oral every 4 hours PRN Dyspepsia  oxyCODONE    5 mG/acetaminophen 325 mG 1 Tablet(s) Oral every 4 hours PRN Moderate Pain (4 - 6)    Allergies    contrast media (iodine-based) (Other)  Naprosyn (Other)  Novocain (Other)  penicillin (Hives)  sulfa drugs (Angioedema)    Intolerances  NSAIDs (Other)    PAST MEDICAL & SURGICAL HISTORY:  Non-ischemic cardiomyopathy  Congestive heart failure  Left bundle branch block  Hyperlipidemia  Hypothyroidism  Hypertension  Ovarian cancer: s/p chemo and surgery - in remission  History of removal of Port-a-Cath  H/O umbilical hernia repair: 1968  Liver lesion: excision April 2016 2&#x27; ovarian cancer  S/P total hysterectomy: Jan. 2014 2&#x27; ovarian cancer  S/P cholecystectomy: April 2016 2&#x27; ovarian ca  S/P appendectomy: April 2016 2&#x27; ovarian ca    Vital Signs Last 24 Hrs  T(C): 36.6 (03 Jul 2019 06:03), Max: 36.6 (03 Jul 2019 06:03)  T(F): 97.8 (03 Jul 2019 06:03), Max: 97.8 (03 Jul 2019 06:03)  HR: 61 (03 Jul 2019 07:45) (57 - 88)  BP: 100/54 (03 Jul 2019 07:45) (89/59 - 125/57)  BP(mean): 81 (03 Jul 2019 06:03) (7 - 81)  RR: 15 (03 Jul 2019 06:03) (15 - 18)  SpO2: 98% (03 Jul 2019 06:03) (94% - 98%)    Physical Exam:  Constitutional: NAD, AAOx3  Cardiovascular: +S1S2 RRR  Pulmonary: CTA b/l, unlabored  Left Chest Wall: No hematoma  GI: soft NTND +BS  Extremities: no pedal edema,   Neuro: non focal, CANCINO x4    LABS:                        10.6   6.74  )-----------( 204      ( 03 Jul 2019 05:19 )             32.4     138  |  107  |  31.0<H>  ----------------------------<  96  4.4   |  22.0  |  0.91  Ca    8.7      03 Jul 2019 05:19  Mg     2.2     07-03    A/P  79 year old female with history of ovarian cancer (s/p chemo/surgery, now in remission), HTN, hypothyroidism, hyperlipidemia, dilated non-ischemic cardiomyopathy, EF <35%, and LBBB who is now s/p uncomplicated Medtronic CRT-D implantation via left axillary stick.      - Discharge home today.   - Outpatient follow up in 2 weeks time.

## 2019-07-03 NOTE — DISCHARGE NOTE NURSING/CASE MANAGEMENT/SOCIAL WORK - NSDCDPATPORTLINK_GEN_ALL_CORE
You can access the [facility] Patient Portal, offered by [facility], by registering with the following website: [email or web address]

## 2019-07-18 ENCOUNTER — NON-APPOINTMENT (OUTPATIENT)
Age: 79
End: 2019-07-18

## 2019-07-18 ENCOUNTER — APPOINTMENT (OUTPATIENT)
Dept: ELECTROPHYSIOLOGY | Facility: CLINIC | Age: 79
End: 2019-07-18
Payer: MEDICARE

## 2019-07-18 VITALS — SYSTOLIC BLOOD PRESSURE: 125 MMHG | DIASTOLIC BLOOD PRESSURE: 82 MMHG

## 2019-07-18 VITALS
BODY MASS INDEX: 27.49 KG/M2 | OXYGEN SATURATION: 94 % | WEIGHT: 165 LBS | SYSTOLIC BLOOD PRESSURE: 120 MMHG | DIASTOLIC BLOOD PRESSURE: 80 MMHG | HEIGHT: 65 IN | HEART RATE: 77 BPM

## 2019-07-18 PROCEDURE — 99024 POSTOP FOLLOW-UP VISIT: CPT

## 2019-07-18 PROCEDURE — 93284 PRGRMG EVAL IMPLANTABLE DFB: CPT

## 2019-07-19 NOTE — HISTORY OF PRESENT ILLNESS
[de-identified] : 79 year old female with history of ovarian cancer (s/p chemo/surgery, now in remission), HTN, hypothyroidism, hyperlipidemia, dilated non-ischemic cardiomyopathy, EF <35%, and LBBB who is now s/p uncomplicated Medtronic CRT-D implant (7/2/2019)\par Presents for post implant device and wound check.  \par \par

## 2019-07-19 NOTE — PHYSICAL EXAM
[Left Infraclavicular] : left infraclavicular area [Clean] : clean [Dry] : dry [Well-Healed] : well-healed [Erythema] : not erythematous [Warm] : not warm [Tender] : not tender [Indurated] : not indurated

## 2019-07-19 NOTE — PROCEDURE
[No] : not [NSR] : normal sinus rhythm [See Device Printout] : See device printout [ICD] : Implantable cardioverter-defibrillator [Medtronic] : Medtronic [Normal] : The battery status is normal. [Sensing Amplitude ___mv] : sensing amplitude was [unfilled] mv [Lead Imp:  ___ohms] : lead impedance was [unfilled] ohms [___V @] : [unfilled] V [___ ms] : [unfilled] ms [None] : none [de-identified] : Shelly [de-identified] : [id number] [de-identified] : 7/2/2019 [de-identified] : BiVP 98%.\par NO AT/AF or VT/VF. No ATP or HV therapies delivered

## 2019-07-19 NOTE — DISCUSSION/SUMMARY
[Pacemaker Function Normal] : normal pacemaker function [Routine Follow-up in 3-4 months] : routine follow-up in 3-4 months [Patient] : the patient [Family] : the patient's family [FreeTextEntry1] : \par Left infraclavicular implant site is healing well and WNL\par Sensing, capture thresholds and impedance are all stable and WNL\par NO AT/AF or VT/VF. No ATP or HV therapies delivered\par BiVP 98%.\par \par Post implant restrictions and shock plan protocol reviewed at length.\par Routine device check in 3 months\par Patient is unsure if she would like to ultilize remote monitoring\par Will discuss again at next follow-up\par \par Cardiology f/up at [facility] with Dr. Aguirre\par \par \par

## 2019-09-29 PROCEDURE — C1777: CPT

## 2019-09-29 PROCEDURE — 93641 EP EVL 1/2CHMB PAC CVDFB TST: CPT

## 2019-09-29 PROCEDURE — C1882: CPT

## 2019-09-29 PROCEDURE — 86923 COMPATIBILITY TEST ELECTRIC: CPT

## 2019-09-29 PROCEDURE — 80048 BASIC METABOLIC PNL TOTAL CA: CPT

## 2019-09-29 PROCEDURE — C1894: CPT

## 2019-09-29 PROCEDURE — 83735 ASSAY OF MAGNESIUM: CPT

## 2019-09-29 PROCEDURE — 93005 ELECTROCARDIOGRAM TRACING: CPT

## 2019-09-29 PROCEDURE — C1898: CPT

## 2019-09-29 PROCEDURE — 33225 L VENTRIC PACING LEAD ADD-ON: CPT

## 2019-09-29 PROCEDURE — 71046 X-RAY EXAM CHEST 2 VIEWS: CPT

## 2019-09-29 PROCEDURE — C1883: CPT

## 2019-09-29 PROCEDURE — 71045 X-RAY EXAM CHEST 1 VIEW: CPT

## 2019-09-29 PROCEDURE — 33249 INSJ/RPLCMT DEFIB W/LEAD(S): CPT

## 2019-09-29 PROCEDURE — C1769: CPT

## 2019-09-29 PROCEDURE — 36415 COLL VENOUS BLD VENIPUNCTURE: CPT

## 2019-09-29 PROCEDURE — C1889: CPT

## 2019-09-29 PROCEDURE — C1900: CPT

## 2019-09-29 PROCEDURE — C1892: CPT

## 2019-09-29 PROCEDURE — 85027 COMPLETE CBC AUTOMATED: CPT

## 2019-10-16 ENCOUNTER — APPOINTMENT (OUTPATIENT)
Dept: ELECTROPHYSIOLOGY | Facility: CLINIC | Age: 79
End: 2019-10-16
Payer: MEDICARE

## 2019-10-16 VITALS
HEART RATE: 73 BPM | HEIGHT: 65 IN | DIASTOLIC BLOOD PRESSURE: 66 MMHG | SYSTOLIC BLOOD PRESSURE: 108 MMHG | BODY MASS INDEX: 27.32 KG/M2 | OXYGEN SATURATION: 99 % | WEIGHT: 164 LBS

## 2019-10-16 PROCEDURE — 93284 PRGRMG EVAL IMPLANTABLE DFB: CPT

## 2019-11-05 PROBLEM — I34.0 MITRAL VALVE REGURGITATION: Status: ACTIVE | Noted: 2019-03-06

## 2019-11-05 PROBLEM — I50.9 CHF (CONGESTIVE HEART FAILURE): Status: ACTIVE | Noted: 2019-03-06

## 2019-11-06 ENCOUNTER — APPOINTMENT (OUTPATIENT)
Dept: CARDIOTHORACIC SURGERY | Facility: CLINIC | Age: 79
End: 2019-11-06
Payer: MEDICARE

## 2019-11-06 VITALS
DIASTOLIC BLOOD PRESSURE: 79 MMHG | WEIGHT: 164 LBS | BODY MASS INDEX: 26.36 KG/M2 | HEART RATE: 74 BPM | HEIGHT: 66 IN | OXYGEN SATURATION: 98 % | SYSTOLIC BLOOD PRESSURE: 138 MMHG | RESPIRATION RATE: 15 BRPM

## 2019-11-06 DIAGNOSIS — Z80.52 FAMILY HISTORY OF MALIGNANT NEOPLASM OF BLADDER: ICD-10-CM

## 2019-11-06 DIAGNOSIS — I50.9 HEART FAILURE, UNSPECIFIED: ICD-10-CM

## 2019-11-06 DIAGNOSIS — I34.0 NONRHEUMATIC MITRAL (VALVE) INSUFFICIENCY: ICD-10-CM

## 2019-11-06 DIAGNOSIS — Z85.43 PERSONAL HISTORY OF MALIGNANT NEOPLASM OF OVARY: ICD-10-CM

## 2019-11-06 PROCEDURE — 99205 OFFICE O/P NEW HI 60 MIN: CPT

## 2019-11-06 NOTE — CONSULT LETTER
[Dear  ___] : Dear  [unfilled], [Consult Letter:] : I had the pleasure of evaluating your patient, [unfilled]. [Please see my note below.] : Please see my note below. [Consult Closing:] : Thank you very much for allowing me to participate in the care of this patient.  If you have any questions, please do not hesitate to contact me. [Sincerely,] : Sincerely, [FreeTextEntry2] : Alessandro Costello MD [FreeTextEntry3] : Sean Houston MD\par  of Cardiothoracic Surgery\par [facility]\par [address] [place] [address] \[place] [address]\par [phone]\par

## 2019-11-06 NOTE — PHYSICAL EXAM
Clinic Care Coordination Contact    CCRN placed another call to patient at 9:03 to ensure she had called EMS - patient reported they were at her home now and taking her to the hospital     CCRN will continue to monitor     Korin Wang Care Coordinator RN  [facility]  [phone]  June 5, 2018          [General Appearance - Alert] : alert [General Appearance - In No Acute Distress] : in no acute distress [Auscultation Breath Sounds / Voice Sounds] : lungs were clear to auscultation bilaterally [Heart Rate And Rhythm] : heart rate was normal and rhythm regular [Heart Sounds] : normal S1 and S2 [Heart Sounds Gallop] : no gallops [Murmurs] : no murmurs [Heart Sounds Pericardial Friction Rub] : no pericardial rub [2+] : left 2+ [No Abnormalities] : the abdominal aorta was not enlarged and no bruit was heard [Outer Ear] : the ears and nose were normal in appearance [Neck Appearance] : the appearance of the neck was normal [] : the neck was supple [Right Carotid Bruit] : no bruit heard over the right carotid [Left Carotid Bruit] : no bruit heard over the left carotid [Right Femoral Bruit] : no bruit heard over the right femoral artery [Left Femoral Bruit] : no bruit heard over the left femoral artery

## 2019-11-06 NOTE — HISTORY OF PRESENT ILLNESS
[FreeTextEntry1] : Ms. HERNÁNDEZ is a 79 year old female referred by Dr. Costello who presents for consultation. She is here today for evaluation of mitral valve disease.  Her past medical history includes HTN, HLD, CHF, dilated cardiomyopathy with LBBB and Medtronic CRT-D and ovarian cancer (remission).\par \par Today she reports feeling well and offers no complaints. Denies any chest pain, shortness of breath, dizziness, palpitations, or lower extremity edema.\par \par

## 2019-11-06 NOTE — ASSESSMENT
[FreeTextEntry1] : Ms. Page is a 79 year old female with severe symptomatic mitral regurgitation. She ambulates and is active. She will need a DANIELLE and Catheterization and will see me again to discuss surgery vs mitral clipping. Given her previous cancer surgery she maybe reluctant to open surgery but will discuss after the testing. \par \par \par I thank you for the opportunity to participate in the care of your patients. Please do not hesitate to contact me should you have any questions.\par

## 2019-11-06 NOTE — DATA REVIEWED
[FreeTextEntry1] : Transthoracic ECHO from 10/17/19 at [facility] demonstrates LVEF 34%, severe mitral regurgitation with regurgitation jet noted to pulmonary veins.

## 2019-11-15 ENCOUNTER — OUTPATIENT (OUTPATIENT)
Dept: OUTPATIENT SERVICES | Facility: HOSPITAL | Age: 79
LOS: 1 days | End: 2019-11-15
Payer: MEDICARE

## 2019-11-15 ENCOUNTER — TRANSCRIPTION ENCOUNTER (OUTPATIENT)
Age: 79
End: 2019-11-15

## 2019-11-15 VITALS
DIASTOLIC BLOOD PRESSURE: 60 MMHG | SYSTOLIC BLOOD PRESSURE: 119 MMHG | HEIGHT: 66 IN | RESPIRATION RATE: 16 BRPM | OXYGEN SATURATION: 100 % | WEIGHT: 163.14 LBS | HEART RATE: 69 BPM

## 2019-11-15 DIAGNOSIS — Z98.890 OTHER SPECIFIED POSTPROCEDURAL STATES: Chronic | ICD-10-CM

## 2019-11-15 DIAGNOSIS — Z90.49 ACQUIRED ABSENCE OF OTHER SPECIFIED PARTS OF DIGESTIVE TRACT: Chronic | ICD-10-CM

## 2019-11-15 DIAGNOSIS — Z90.710 ACQUIRED ABSENCE OF BOTH CERVIX AND UTERUS: Chronic | ICD-10-CM

## 2019-11-15 DIAGNOSIS — K76.9 LIVER DISEASE, UNSPECIFIED: Chronic | ICD-10-CM

## 2019-11-15 DIAGNOSIS — I34.0 NONRHEUMATIC MITRAL (VALVE) INSUFFICIENCY: ICD-10-CM

## 2019-11-15 LAB
ANION GAP SERPL CALC-SCNC: 11 MMOL/L — SIGNIFICANT CHANGE UP (ref 5–17)
APTT BLD: 34.9 SEC — SIGNIFICANT CHANGE UP (ref 27.5–36.3)
BUN SERPL-MCNC: 36 MG/DL — HIGH (ref 8–20)
CALCIUM SERPL-MCNC: 9.6 MG/DL — SIGNIFICANT CHANGE UP (ref 8.6–10.2)
CHLORIDE SERPL-SCNC: 104 MMOL/L — SIGNIFICANT CHANGE UP (ref 98–107)
CO2 SERPL-SCNC: 24 MMOL/L — SIGNIFICANT CHANGE UP (ref 22–29)
CREAT SERPL-MCNC: 0.93 MG/DL — SIGNIFICANT CHANGE UP (ref 0.5–1.3)
GLUCOSE SERPL-MCNC: 97 MG/DL — SIGNIFICANT CHANGE UP (ref 70–115)
HCT VFR BLD CALC: 37.1 % — SIGNIFICANT CHANGE UP (ref 34.5–45)
HGB BLD-MCNC: 12.1 G/DL — SIGNIFICANT CHANGE UP (ref 11.5–15.5)
INR BLD: 1 RATIO — SIGNIFICANT CHANGE UP (ref 0.88–1.16)
MAGNESIUM SERPL-MCNC: 2.1 MG/DL — SIGNIFICANT CHANGE UP (ref 1.6–2.6)
MCHC RBC-ENTMCNC: 31.6 PG — SIGNIFICANT CHANGE UP (ref 27–34)
MCHC RBC-ENTMCNC: 32.6 GM/DL — SIGNIFICANT CHANGE UP (ref 32–36)
MCV RBC AUTO: 96.9 FL — SIGNIFICANT CHANGE UP (ref 80–100)
PLATELET # BLD AUTO: 198 K/UL — SIGNIFICANT CHANGE UP (ref 150–400)
POTASSIUM SERPL-MCNC: 4.6 MMOL/L — SIGNIFICANT CHANGE UP (ref 3.5–5.3)
POTASSIUM SERPL-SCNC: 4.6 MMOL/L — SIGNIFICANT CHANGE UP (ref 3.5–5.3)
PROTHROM AB SERPL-ACNC: 11.5 SEC — SIGNIFICANT CHANGE UP (ref 10–12.9)
RBC # BLD: 3.83 M/UL — SIGNIFICANT CHANGE UP (ref 3.8–5.2)
RBC # FLD: 12.6 % — SIGNIFICANT CHANGE UP (ref 10.3–14.5)
SODIUM SERPL-SCNC: 139 MMOL/L — SIGNIFICANT CHANGE UP (ref 135–145)
WBC # BLD: 5.27 K/UL — SIGNIFICANT CHANGE UP (ref 3.8–10.5)
WBC # FLD AUTO: 5.27 K/UL — SIGNIFICANT CHANGE UP (ref 3.8–10.5)

## 2019-11-15 PROCEDURE — 93320 DOPPLER ECHO COMPLETE: CPT | Mod: 26

## 2019-11-15 PROCEDURE — 80048 BASIC METABOLIC PNL TOTAL CA: CPT

## 2019-11-15 PROCEDURE — 93312 ECHO TRANSESOPHAGEAL: CPT | Mod: 26

## 2019-11-15 PROCEDURE — 85610 PROTHROMBIN TIME: CPT

## 2019-11-15 PROCEDURE — 93010 ELECTROCARDIOGRAM REPORT: CPT

## 2019-11-15 PROCEDURE — 93325 DOPPLER ECHO COLOR FLOW MAPG: CPT

## 2019-11-15 PROCEDURE — 85027 COMPLETE CBC AUTOMATED: CPT

## 2019-11-15 PROCEDURE — 93325 DOPPLER ECHO COLOR FLOW MAPG: CPT | Mod: 26

## 2019-11-15 PROCEDURE — 93005 ELECTROCARDIOGRAM TRACING: CPT

## 2019-11-15 PROCEDURE — 93312 ECHO TRANSESOPHAGEAL: CPT

## 2019-11-15 PROCEDURE — 93320 DOPPLER ECHO COMPLETE: CPT

## 2019-11-15 PROCEDURE — 36415 COLL VENOUS BLD VENIPUNCTURE: CPT

## 2019-11-15 PROCEDURE — 85730 THROMBOPLASTIN TIME PARTIAL: CPT

## 2019-11-15 PROCEDURE — 83735 ASSAY OF MAGNESIUM: CPT

## 2019-11-15 RX ORDER — CARVEDILOL PHOSPHATE 80 MG/1
1 CAPSULE, EXTENDED RELEASE ORAL
Qty: 0 | Refills: 0 | DISCHARGE

## 2019-11-15 RX ORDER — UBIDECARENONE 100 MG
1 CAPSULE ORAL
Qty: 0 | Refills: 0 | DISCHARGE

## 2019-11-15 RX ORDER — CHOLECALCIFEROL (VITAMIN D3) 125 MCG
1 CAPSULE ORAL
Qty: 0 | Refills: 0 | DISCHARGE

## 2019-11-15 RX ORDER — SACUBITRIL AND VALSARTAN 24; 26 MG/1; MG/1
1 TABLET, FILM COATED ORAL
Qty: 0 | Refills: 0 | DISCHARGE

## 2019-11-15 RX ORDER — LEVOTHYROXINE SODIUM 125 MCG
1 TABLET ORAL
Qty: 0 | Refills: 0 | DISCHARGE

## 2019-11-15 RX ORDER — LACTOBACILLUS ACIDOPHILUS 100MM CELL
1 CAPSULE ORAL
Qty: 0 | Refills: 0 | DISCHARGE

## 2019-11-15 RX ORDER — ASPIRIN/CALCIUM CARB/MAGNESIUM 324 MG
1 TABLET ORAL
Qty: 0 | Refills: 0 | DISCHARGE

## 2019-11-15 RX ORDER — ATORVASTATIN CALCIUM 80 MG/1
1 TABLET, FILM COATED ORAL
Qty: 0 | Refills: 0 | DISCHARGE

## 2019-11-15 NOTE — DISCHARGE NOTE PROVIDER - CARE PROVIDER_API CALL
Wilfred Aguirre)  Cardiovascular Disease; Internal Medicine; Interventional Cardiology  [address] [place] [address]  Phone: [phone]  Fax: [phone]  Follow Up Time:

## 2019-11-15 NOTE — PROGRESS NOTE ADULT - SUBJECTIVE AND OBJECTIVE BOX
Department of Cardiology                                                                  [facility]/[place] [address] [facility]                                                            Telephone: [phone]. Fax:[phone]                                                                                         DANIELLE NOTE     79y Female S/P DANIELLE which showed:   Mild - moderate MR   EF remains low   pt tolerated procedure well     PAST MEDICAL & SURGICAL HISTORY:  Severe mitral regurgitation  Non-ischemic cardiomyopathy  Congestive heart failure  Left bundle branch block  Hyperlipidemia  Hypothyroidism  Hypertension  Ovarian cancer: s/p chemo and surgery - in remission  History of removal of Port-a-Cath  H/O umbilical hernia repair: 1968  Liver lesion: excision April 2016 2&#x27; ovarian cancer  S/P total hysterectomy: Jan. 2014 2&#x27; ovarian cancer  S/P cholecystectomy: April 2016 2&#x27; ovarian ca  S/P appendectomy: April 2016 2&#x27; ovarian ca      Home Medications:  Acidophilus oral capsule: 1 cap(s) orally once a day (15 Nov 2019 08:32)  aspirin 81 mg oral tablet: 1 tab(s) orally once a day (15 Nov 2019 08:32)  atorvastatin 20 mg oral tablet: 1 tab(s) orally once a day (15 Nov 2019 08:32)  carvedilol 12.5 mg oral tablet: 1 tab(s) orally 2 times a day (15 Nov 2019 08:32)  Centrum Silver oral tablet: 1 tab(s) orally once a day (15 Nov 2019 08:32)  Entresto 97 mg-103 mg oral tablet: 1 tab(s) orally 2 times a day (15 Nov 2019 08:32)  Synthroid 75 mcg (0.075 mg) oral tablet: 1 tab(s) orally once a day (15 Nov 2019 08:32)  ubiquinol: 100 milligram(s) orally once a day (15 Nov 2019 08:32)  Vitamin D3 1000 intl units oral tablet: 1 tab(s) orally once a day (15 Nov 2019 08:32)      Objective:   T(C): --  HR: 69 (11-15-19 @ 08:25) (69 - 69)  BP: 119/60 (11-15-19 @ 08:25) (119/60 - 119/60)  RR: 16 (11-15-19 @ 08:25) (16 - 16)  SpO2: 100% (11-15-19 @ 08:25) (100% - 100%)    CM: SR  General: Awake, alert, speech clear, no acute distress  Chest: S1, S2, RRR, CTA B/L  Neuro: A&OX3, CN II-XII grossly intact                          12.1   5.27  )-----------( 198      ( 15 Nov 2019 08:29 )             37.1     11-15    139  |  104  |  36.0<H>  ----------------------------<  97  4.6   |  24.0  |  0.93    Ca    9.6      15 Nov 2019 08:29  Mg     2.1     11-15      PT/INR - ( 15 Nov 2019 08:29 )   PT: 11.5 sec;   INR: 1.00 ratio         PTT - ( 15 Nov 2019 08:29 )  PTT:34.9 sec    Assessment:     Plan:   1. NPO for 2 hours    2. Assess ability to swallow prior to discharge.    3. Follow up with: Dr Quintanilla     4. Discharge home when stable

## 2019-11-15 NOTE — H&P PST ADULT - ASSESSMENT
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 Pt with severe  mitral valve disease Pt evaluated by Dr Houston for possible surgical repair vs mitral clipping .   Pt presents today for DANIELLE evaluation of Mitral valve   PRE-PROCEDURE ASSESSMENT  DANIELLE   -Patient seen and examined  -Labs reviewed  -Pre-procedure teaching completed with patient and family    -Questions answered

## 2019-11-15 NOTE — H&P PST ADULT - HISTORY OF PRESENT ILLNESS
This is a 79 year old female with HTN HL dilated cardiomyopathy with LBBB and Medtronic CRT-D and ovarian cancer. Pt with severe  mitral valve disease   Pt evaluated This is a 79 year old female with HTN HL dilated cardiomyopathy with LBBB and Medtronic CRT-D and ovarian [id number] and recurrent 2016 with chemotherapy Pt reports that chemo agents were cardiotoxic . Pt with severe  mitral valve disease Pt evaluated by Dr Houston for possible surgical repair vs mitral clipping .   Pt presents today for DANIELLE evaluation of Mitral valve   Pt reports no palpitations syncope  chest pain , rest SOB weight gain or loss , black or blood in stool , no fever or , chills . She reports some exertional SOB but limits her stair climbing   Currently feels well  Mallampati /ASA to be assessed by Anesthesia       Cardiology summary:  Stress test: 11/16/18, abnormal spect, LVEF 20%, severe Lv dilatation  Echo: 2/27/19, LVEF 35%, globally reduced LV function, RCA distribution and entire septum are abnormal, mod dilated LV, LA mildly dilated, mod-severe MR, mod TR   Cardiac Cath: 11/29/18, LAD moderate irregularities, LCx moderate irregularities, RCA moderate irregularities.  LVEF 25%

## 2019-11-15 NOTE — DISCHARGE NOTE PROVIDER - NSDCMRMEDTOKEN_GEN_ALL_CORE_FT
Acidophilus oral capsule: 1 cap(s) orally once a day  aspirin 81 mg oral tablet: 1 tab(s) orally once a day  atorvastatin 20 mg oral tablet: 1 tab(s) orally once a day  carvedilol 12.5 mg oral tablet: 1 tab(s) orally 2 times a day  Centrum Silver oral tablet: 1 tab(s) orally once a day  Entresto 97 mg-103 mg oral tablet: 1 tab(s) orally 2 times a day  Synthroid 75 mcg (0.075 mg) oral tablet: 1 tab(s) orally once a day  ubiquinol: 100 milligram(s) orally once a day  Vitamin D3 1000 intl units oral tablet: 1 tab(s) orally once a day

## 2019-11-15 NOTE — DISCHARGE NOTE PROVIDER - NSDCCPCAREPLAN_GEN_ALL_CORE_FT
PRINCIPAL DISCHARGE DIAGNOSIS  Diagnosis: H/O mitral valve disease  Assessment and Plan of Treatment: Continue all present medications   Follow up with Cardiologist

## 2019-11-15 NOTE — DISCHARGE NOTE PROVIDER - HOSPITAL COURSE
Mild - moderate MR     EF remains low     pt tolerated procedure well                 Home Medications:    Acidophilus oral capsule: 1 cap(s) orally once a day (15 Nov 2019 08:32)    aspirin 81 mg oral tablet: 1 tab(s) orally once a day (15 Nov 2019 08:32)    atorvastatin 20 mg oral tablet: 1 tab(s) orally once a day (15 Nov 2019 08:32)    carvedilol 12.5 mg oral tablet: 1 tab(s) orally 2 times a day (15 Nov 2019 08:32)    Centrum Silver oral tablet: 1 tab(s) orally once a day (15 Nov 2019 08:32)    Entresto 97 mg-103 mg oral tablet: 1 tab(s) orally 2 times a day (15 Nov 2019 08:32)    Synthroid 75 mcg (0.075 mg) oral tablet: 1 tab(s) orally once a day (15 Nov 2019 08:32)    ubiquinol: 100 milligram(s) orally once a day (15 Nov 2019 08:32)    Vitamin D3 1000 intl units oral tablet: 1 tab(s) orally once a day (15 Nov 2019 08:32)                HR: 69 (11-15-19 @ 08:25) (69 - 69)    BP: 119/60 (11-15-19 @ 08:25) (119/60 - 119/60)    RR: 16 (11-15-19 @ 08:25) (16 - 16)    SpO2: 100% (11-15-19 @ 08:25) (100% - 100%)        CM: SR    General: Awake, alert, speech clear, no acute distress    Chest: S1, S2, RRR, CTA B/L    Neuro: A&OX3, CN II-XII grossly intact                                12.1     5.27  )-----------( 198      ( 15 Nov 2019 08:29 )               37.1         11-15        139  |  104  |  36.0<H>    ----------------------------<  97    4.6   |  24.0  |  0.93        Ca    9.6      15 Nov 2019 08:29    Mg     2.1     11-15            PT/INR - ( 15 Nov 2019 08:29 )   PT: 11.5 sec;   INR: 1.00 ratio               PTT - ( 15 Nov 2019 08:29 )  PTT:34.9 sec        Assessment: s/p DANIELLE for MR tolerated well        Plan:     1. NPO for 2 hours        2. Assess ability to swallow prior to discharge.        3. Follow up with: Dr Quintanilla         4. Discharge home when stable

## 2019-11-19 ENCOUNTER — APPOINTMENT (OUTPATIENT)
Dept: CARDIOTHORACIC SURGERY | Facility: CLINIC | Age: 79
End: 2019-11-19
Payer: MEDICARE

## 2019-11-19 VITALS
HEIGHT: 66 IN | RESPIRATION RATE: 15 BRPM | BODY MASS INDEX: 26.36 KG/M2 | WEIGHT: 164 LBS | OXYGEN SATURATION: 99 % | SYSTOLIC BLOOD PRESSURE: 103 MMHG | HEART RATE: 70 BPM | DIASTOLIC BLOOD PRESSURE: 50 MMHG

## 2019-11-19 PROBLEM — I34.0 NONRHEUMATIC MITRAL (VALVE) INSUFFICIENCY: Chronic | Status: ACTIVE | Noted: 2019-11-15

## 2019-11-19 PROCEDURE — 99213 OFFICE O/P EST LOW 20 MIN: CPT

## 2019-11-19 NOTE — PHYSICAL EXAM
[Auscultation Breath Sounds / Voice Sounds] : lungs were clear to auscultation bilaterally [Heart Rate And Rhythm] : heart rate was normal and rhythm regular [Murmurs] : no murmurs [Heart Sounds Gallop] : no gallops [Heart Sounds] : normal S1 and S2 [Heart Sounds Pericardial Friction Rub] : no pericardial rub [No Abnormalities] : the abdominal aorta was not enlarged and no bruit was heard [2+] : left 2+ [Sclera] : the sclera and conjunctiva were normal [Neck Appearance] : the appearance of the neck was normal [] : the neck was supple [Right Carotid Bruit] : no bruit heard over the right carotid [Right Femoral Bruit] : no bruit heard over the right femoral artery [Left Femoral Bruit] : no bruit heard over the left femoral artery [Left Carotid Bruit] : no bruit heard over the left carotid [Bowel Sounds] : normal bowel sounds [No CVA Tenderness] : no ~M costovertebral angle tenderness [Abnormal Walk] : normal gait [Cranial Nerves] : cranial nerves 2-12 were intact [Skin Color & Pigmentation] : normal skin color and pigmentation [Affect] : the affect was normal [Oriented To Time, Place, And Person] : oriented to person, place, and time

## 2019-11-19 NOTE — HISTORY OF PRESENT ILLNESS
[FreeTextEntry1] : Ms. HERNÁNDEZ is a 79 year old female referred by Dr. Costello who presents for consultation. She is here today for evaluation of mitral valve disease.  Her past medical history includes HTN, HLD, CHF, dilated cardiomyopathy with LBBB and Medtronic CRT-D and ovarian cancer (remission).\par \par \par Transthoracic ECHO from 10/17/19 at [facility] demonstrates LVEF 34%, severe mitral regurgitation with regurgitation jet noted to pulmonary veins.  \par \par Ms. HERNÁNDEZ denies any chest pain, doziness, palpations, shortness of breath on exertion, or bilateral lower extremity edema. \par \par DANIELLE at [place] demonstrated only mild to moderate mitral regurgitation.

## 2019-11-19 NOTE — CONSULT LETTER
[Courtesy Letter:] : I had the pleasure of seeing your patient, [unfilled], in my office today. [Dear  ___] : Dear  [unfilled], [Please see my note below.] : Please see my note below. [Consult Closing:] : Thank you very much for allowing me to participate in the care of this patient.  If you have any questions, please do not hesitate to contact me. [Sincerely,] : Sincerely, [FreeTextEntry2] : Alessandro Costello MD [FreeTextEntry3] : Sean Houston MD\par  of Cardiothoracic Surgery\par [facility]\par [address] [place] [address] \[place] [address]\par [phone]\par  [DrElizabeth  ___] : Dr. MURRIETA

## 2019-11-19 NOTE — ASSESSMENT
[FreeTextEntry1] : Ms. Page is a 79 year old female with Ef 20% who was found to have severe MR on TTE at Dr. Aguirre office. On DANIELLE at [place] she was only found to have mild to moderate mitral regurgitation. She will need a repeat TTE without sedation to see if the MR is more significant without sedation. \par \par \par I thank you for the opportunity to participate in the care of your patients. Please do not hesitate to contact me should you have any questions.\par

## 2019-11-19 NOTE — DATA REVIEWED
[FreeTextEntry1] : Transthoracic ECHO from 10/17/19 at [facility] demonstrates LVEF 34%, severe mitral regurgitation with regurgitation jet noted to pulmonary veins.  \par \par Transesophageal ECHO from 11/15/19 at [facility]\par -LVEF <20%\par -left atrial enlargement\par -Tethered mitral leaflets with annular dilation, mild to moderate mitral regurgitation\par

## 2019-11-20 ENCOUNTER — OUTPATIENT (OUTPATIENT)
Dept: OUTPATIENT SERVICES | Facility: HOSPITAL | Age: 79
LOS: 1 days | End: 2019-11-20
Payer: MEDICARE

## 2019-11-20 DIAGNOSIS — Z98.890 OTHER SPECIFIED POSTPROCEDURAL STATES: Chronic | ICD-10-CM

## 2019-11-20 DIAGNOSIS — Z90.49 ACQUIRED ABSENCE OF OTHER SPECIFIED PARTS OF DIGESTIVE TRACT: Chronic | ICD-10-CM

## 2019-11-20 DIAGNOSIS — I34.0 NONRHEUMATIC MITRAL (VALVE) INSUFFICIENCY: ICD-10-CM

## 2019-11-20 DIAGNOSIS — Z90.710 ACQUIRED ABSENCE OF BOTH CERVIX AND UTERUS: Chronic | ICD-10-CM

## 2019-11-20 DIAGNOSIS — K76.9 LIVER DISEASE, UNSPECIFIED: Chronic | ICD-10-CM

## 2019-11-20 PROCEDURE — 93306 TTE W/DOPPLER COMPLETE: CPT

## 2019-11-20 PROCEDURE — 93306 TTE W/DOPPLER COMPLETE: CPT | Mod: 26

## 2020-06-10 ENCOUNTER — APPOINTMENT (OUTPATIENT)
Dept: ELECTROPHYSIOLOGY | Facility: CLINIC | Age: 80
End: 2020-06-10
Payer: MEDICARE

## 2020-06-10 VITALS
SYSTOLIC BLOOD PRESSURE: 131 MMHG | WEIGHT: 168 LBS | OXYGEN SATURATION: 100 % | BODY MASS INDEX: 27 KG/M2 | TEMPERATURE: 97.7 F | HEART RATE: 64 BPM | HEIGHT: 66 IN | DIASTOLIC BLOOD PRESSURE: 78 MMHG

## 2020-06-10 PROCEDURE — 93284 PRGRMG EVAL IMPLANTABLE DFB: CPT

## 2020-07-09 ENCOUNTER — APPOINTMENT (OUTPATIENT)
Dept: ELECTROPHYSIOLOGY | Facility: CLINIC | Age: 80
End: 2020-07-09
Payer: MEDICARE

## 2020-07-09 PROCEDURE — 93295 DEV INTERROG REMOTE 1/2/MLT: CPT

## 2020-07-09 PROCEDURE — 93296 REM INTERROG EVL PM/IDS: CPT

## 2020-10-09 ENCOUNTER — APPOINTMENT (OUTPATIENT)
Dept: ELECTROPHYSIOLOGY | Facility: CLINIC | Age: 80
End: 2020-10-09
Payer: MEDICARE

## 2020-10-09 PROCEDURE — 93295 DEV INTERROG REMOTE 1/2/MLT: CPT

## 2020-10-09 PROCEDURE — 93296 REM INTERROG EVL PM/IDS: CPT

## 2021-01-08 ENCOUNTER — APPOINTMENT (OUTPATIENT)
Dept: ELECTROPHYSIOLOGY | Facility: CLINIC | Age: 81
End: 2021-01-08
Payer: MEDICARE

## 2021-01-08 PROCEDURE — 93296 REM INTERROG EVL PM/IDS: CPT

## 2021-01-08 PROCEDURE — 93295 DEV INTERROG REMOTE 1/2/MLT: CPT

## 2021-01-28 ENCOUNTER — APPOINTMENT (OUTPATIENT)
Dept: ELECTROPHYSIOLOGY | Facility: CLINIC | Age: 81
End: 2021-01-28

## 2021-02-17 NOTE — H&P PST ADULT - NSICDXPASTMEDICALHX_GEN_ALL_CORE_FT
normal... PAST MEDICAL HISTORY:  Congestive heart failure     Hyperlipidemia     Hypertension     Hypothyroidism     Left bundle branch block     Non-ischemic cardiomyopathy     Ovarian cancer s/p chemo and surgery - in remission

## 2021-04-09 ENCOUNTER — NON-APPOINTMENT (OUTPATIENT)
Age: 81
End: 2021-04-09

## 2021-04-09 ENCOUNTER — APPOINTMENT (OUTPATIENT)
Dept: ELECTROPHYSIOLOGY | Facility: CLINIC | Age: 81
End: 2021-04-09
Payer: MEDICARE

## 2021-04-09 PROCEDURE — 93295 DEV INTERROG REMOTE 1/2/MLT: CPT

## 2021-04-09 PROCEDURE — 93296 REM INTERROG EVL PM/IDS: CPT

## 2021-07-09 ENCOUNTER — NON-APPOINTMENT (OUTPATIENT)
Age: 81
End: 2021-07-09

## 2021-07-09 ENCOUNTER — APPOINTMENT (OUTPATIENT)
Dept: ELECTROPHYSIOLOGY | Facility: CLINIC | Age: 81
End: 2021-07-09
Payer: MEDICARE

## 2021-07-09 PROCEDURE — 93296 REM INTERROG EVL PM/IDS: CPT

## 2021-07-09 PROCEDURE — 93295 DEV INTERROG REMOTE 1/2/MLT: CPT

## 2021-10-08 ENCOUNTER — APPOINTMENT (OUTPATIENT)
Dept: ELECTROPHYSIOLOGY | Facility: CLINIC | Age: 81
End: 2021-10-08
Payer: MEDICARE

## 2021-10-08 ENCOUNTER — NON-APPOINTMENT (OUTPATIENT)
Age: 81
End: 2021-10-08

## 2021-10-08 PROCEDURE — 93296 REM INTERROG EVL PM/IDS: CPT

## 2021-10-08 PROCEDURE — 93295 DEV INTERROG REMOTE 1/2/MLT: CPT

## 2022-01-07 ENCOUNTER — NON-APPOINTMENT (OUTPATIENT)
Age: 82
End: 2022-01-07

## 2022-01-07 ENCOUNTER — APPOINTMENT (OUTPATIENT)
Dept: ELECTROPHYSIOLOGY | Facility: CLINIC | Age: 82
End: 2022-01-07
Payer: MEDICARE

## 2022-01-07 PROCEDURE — 93296 REM INTERROG EVL PM/IDS: CPT | Mod: NC

## 2022-01-07 PROCEDURE — 93295 DEV INTERROG REMOTE 1/2/MLT: CPT | Mod: NC

## 2022-04-08 ENCOUNTER — APPOINTMENT (OUTPATIENT)
Dept: ELECTROPHYSIOLOGY | Facility: CLINIC | Age: 82
End: 2022-04-08
Payer: MEDICARE

## 2022-04-08 ENCOUNTER — NON-APPOINTMENT (OUTPATIENT)
Age: 82
End: 2022-04-08

## 2022-04-08 PROCEDURE — 93296 REM INTERROG EVL PM/IDS: CPT

## 2022-04-08 PROCEDURE — 93295 DEV INTERROG REMOTE 1/2/MLT: CPT

## 2022-07-08 ENCOUNTER — NON-APPOINTMENT (OUTPATIENT)
Age: 82
End: 2022-07-08

## 2022-07-08 ENCOUNTER — APPOINTMENT (OUTPATIENT)
Dept: ELECTROPHYSIOLOGY | Facility: CLINIC | Age: 82
End: 2022-07-08

## 2022-07-08 PROCEDURE — 93295 DEV INTERROG REMOTE 1/2/MLT: CPT

## 2022-07-08 PROCEDURE — 93296 REM INTERROG EVL PM/IDS: CPT

## 2022-10-07 ENCOUNTER — APPOINTMENT (OUTPATIENT)
Dept: ELECTROPHYSIOLOGY | Facility: CLINIC | Age: 82
End: 2022-10-07

## 2022-10-07 ENCOUNTER — NON-APPOINTMENT (OUTPATIENT)
Age: 82
End: 2022-10-07

## 2022-10-07 PROCEDURE — 93295 DEV INTERROG REMOTE 1/2/MLT: CPT

## 2022-10-07 PROCEDURE — 93296 REM INTERROG EVL PM/IDS: CPT

## 2023-01-06 ENCOUNTER — NON-APPOINTMENT (OUTPATIENT)
Age: 83
End: 2023-01-06

## 2023-01-06 ENCOUNTER — APPOINTMENT (OUTPATIENT)
Dept: ELECTROPHYSIOLOGY | Facility: CLINIC | Age: 83
End: 2023-01-06
Payer: MEDICARE

## 2023-01-06 PROCEDURE — 93295 DEV INTERROG REMOTE 1/2/MLT: CPT

## 2023-01-06 PROCEDURE — 93296 REM INTERROG EVL PM/IDS: CPT

## 2023-03-02 NOTE — ASU PATIENT PROFILE, ADULT - NS TRANSFER PATIENT BELONGINGS
None Render Risk Assessment In Note?: no Note Text (......Xxx Chief Complaint.): This diagnosis correlates with the Detail Level: Zone Other (Free Text): Not specifically noted at 3/2/23 OV.

## 2023-04-07 ENCOUNTER — APPOINTMENT (OUTPATIENT)
Dept: ELECTROPHYSIOLOGY | Facility: CLINIC | Age: 83
End: 2023-04-07
Payer: MEDICARE

## 2023-04-07 ENCOUNTER — NON-APPOINTMENT (OUTPATIENT)
Age: 83
End: 2023-04-07

## 2023-04-07 PROCEDURE — 93296 REM INTERROG EVL PM/IDS: CPT

## 2023-04-07 PROCEDURE — 93295 DEV INTERROG REMOTE 1/2/MLT: CPT

## 2023-06-30 ENCOUNTER — APPOINTMENT (OUTPATIENT)
Dept: ELECTROPHYSIOLOGY | Facility: CLINIC | Age: 83
End: 2023-06-30
Payer: MEDICARE

## 2023-06-30 VITALS
HEART RATE: 69 BPM | SYSTOLIC BLOOD PRESSURE: 124 MMHG | BODY MASS INDEX: 27 KG/M2 | OXYGEN SATURATION: 97 % | HEIGHT: 66 IN | DIASTOLIC BLOOD PRESSURE: 79 MMHG | WEIGHT: 168 LBS

## 2023-06-30 PROCEDURE — 93284 PRGRMG EVAL IMPLANTABLE DFB: CPT

## 2023-06-30 PROCEDURE — 93000 ELECTROCARDIOGRAM COMPLETE: CPT | Mod: 59

## 2023-09-29 ENCOUNTER — APPOINTMENT (OUTPATIENT)
Dept: ELECTROPHYSIOLOGY | Facility: CLINIC | Age: 83
End: 2023-09-29

## 2023-10-27 ENCOUNTER — APPOINTMENT (OUTPATIENT)
Dept: ELECTROPHYSIOLOGY | Facility: CLINIC | Age: 83
End: 2023-10-27
Payer: MEDICARE

## 2023-10-27 ENCOUNTER — NON-APPOINTMENT (OUTPATIENT)
Age: 83
End: 2023-10-27

## 2023-10-27 PROCEDURE — 93295 DEV INTERROG REMOTE 1/2/MLT: CPT

## 2023-10-27 PROCEDURE — 93296 REM INTERROG EVL PM/IDS: CPT

## 2024-01-26 ENCOUNTER — NON-APPOINTMENT (OUTPATIENT)
Age: 84
End: 2024-01-26

## 2024-01-26 ENCOUNTER — APPOINTMENT (OUTPATIENT)
Dept: ELECTROPHYSIOLOGY | Facility: CLINIC | Age: 84
End: 2024-01-26
Payer: MEDICARE

## 2024-01-26 PROCEDURE — 93295 DEV INTERROG REMOTE 1/2/MLT: CPT

## 2024-01-26 PROCEDURE — 93296 REM INTERROG EVL PM/IDS: CPT

## 2024-04-25 ENCOUNTER — NON-APPOINTMENT (OUTPATIENT)
Age: 84
End: 2024-04-25

## 2024-04-26 ENCOUNTER — APPOINTMENT (OUTPATIENT)
Dept: ELECTROPHYSIOLOGY | Facility: CLINIC | Age: 84
End: 2024-04-26
Payer: MEDICARE

## 2024-04-26 PROCEDURE — 93295 DEV INTERROG REMOTE 1/2/MLT: CPT

## 2024-04-26 PROCEDURE — 93296 REM INTERROG EVL PM/IDS: CPT

## 2024-07-10 NOTE — ASU PATIENT PROFILE, ADULT - PATIENT REPRESENTATIVE NAME
----- Message from Marivel Martinez sent at 7/10/2024 10:18 AM CDT -----  Contact: pt  Type:  Needs Medical Advice    Who Called: pt    Symptoms (please be specific): stung by wasp on left foot, localized swelling, burning, itching, very red    How long has patient had these symptoms:   yesterday    Pharmacy name and phone #:    [facility] [place], [place] - [address]  [address] [address]  Phone: [phone] Fax: [phone]    Would the patient rather a call back or a response via [facility]? Call back    Best Call Back Number: [phone]    Additional Information: wants to make sure this is normal and what can be done to sooth    Denies any facial, throat, tongue swelling or difficulty breathing.    Please call to advise  Thanks   Deandre

## 2024-07-26 ENCOUNTER — NON-APPOINTMENT (OUTPATIENT)
Age: 84
End: 2024-07-26

## 2024-07-26 ENCOUNTER — APPOINTMENT (OUTPATIENT)
Dept: ELECTROPHYSIOLOGY | Facility: CLINIC | Age: 84
End: 2024-07-26
Payer: MEDICARE

## 2024-07-26 PROCEDURE — 93295 DEV INTERROG REMOTE 1/2/MLT: CPT

## 2024-07-26 PROCEDURE — 93296 REM INTERROG EVL PM/IDS: CPT

## 2024-10-25 ENCOUNTER — APPOINTMENT (OUTPATIENT)
Dept: ELECTROPHYSIOLOGY | Facility: CLINIC | Age: 84
End: 2024-10-25

## 2025-07-30 NOTE — ASU PATIENT PROFILE, ADULT - NS PRO PASSIVE SMOKE EXP
Problems/Goals  Skin Integrity (Body Function Structure)  Current Status: Risk for further skin breakdown  Long Term Goals  07/22/2025 02:24 PM - Active  No worsening of skin breakdown/ No new skin breakdown  Potential for Injury (Safety)  Current Status: Risk for falls  Long Term Goals  07/22/2025 02:25 PM - Active  No falls this hospital stay    Signed by: Eusebia Davis RN     No